# Patient Record
Sex: MALE | Race: WHITE | URBAN - METROPOLITAN AREA
[De-identification: names, ages, dates, MRNs, and addresses within clinical notes are randomized per-mention and may not be internally consistent; named-entity substitution may affect disease eponyms.]

---

## 2025-06-04 ENCOUNTER — OFFICE VISIT (OUTPATIENT)
Age: 75
End: 2025-06-04
Payer: MEDICARE

## 2025-06-04 VITALS — HEIGHT: 71 IN | BODY MASS INDEX: 27.29 KG/M2 | WEIGHT: 194.9 LBS

## 2025-06-04 DIAGNOSIS — M25.562 LEFT KNEE PAIN, UNSPECIFIED CHRONICITY: Primary | ICD-10-CM

## 2025-06-04 DIAGNOSIS — M17.12 ARTHRITIS OF LEFT KNEE: ICD-10-CM

## 2025-06-04 PROCEDURE — 99203 OFFICE O/P NEW LOW 30 MIN: CPT | Performed by: ORTHOPAEDIC SURGERY

## 2025-06-04 PROCEDURE — 1123F ACP DISCUSS/DSCN MKR DOCD: CPT | Performed by: ORTHOPAEDIC SURGERY

## 2025-06-04 PROCEDURE — 1159F MED LIST DOCD IN RCRD: CPT | Performed by: ORTHOPAEDIC SURGERY

## 2025-06-04 RX ORDER — SIMVASTATIN 40 MG
40 TABLET ORAL NIGHTLY
COMMUNITY

## 2025-06-04 RX ORDER — ROSUVASTATIN CALCIUM 10 MG/1
TABLET, COATED ORAL
COMMUNITY

## 2025-06-04 RX ORDER — METHYLPREDNISOLONE 4 MG/1
TABLET ORAL
Qty: 1 KIT | Refills: 0 | Status: SHIPPED | OUTPATIENT
Start: 2025-06-04 | End: 2025-06-10

## 2025-06-04 RX ORDER — ALLOPURINOL 100 MG/1
200 TABLET ORAL DAILY
COMMUNITY

## 2025-06-04 NOTE — PROGRESS NOTES
Patient ID:  Alejandro Weeks  [id number]  75 y.o.  1950    Today: June 4, 2025          Chief Complaint:  Left Knee pain    HPI:       Alejandro Weeks is a 75 y.o. male seen for evaluation and treatment of left knee pain.  He notes global knee pain predominant with the anterior aspect of his knee after standing in an event at the club.  He denies any acute trauma. Generally, symptoms improve with sitting/rest. The pain affects the patient’s activities of daily living and quality of life. . Pain ranges from approximately 4-8 in a cyclical fashion with periods of acute exacerbation.  Denies any acute injury but he has had an intra-articular injection in the past.  He takes anti-inflammatory medicine occasionally with good pain relief.  He is using no assistive ice for ambulation.  He is not having functionally pain      Past Medical History:  No past medical history on file.    Past Surgical History:  No past surgical history on file.     Medications:     Prior to Admission medications    Medication Sig Start Date End Date Taking? Authorizing Provider   Coenzyme Q10 (COQ-10) 200 MG CAPS  6/1/25  Yes Diana Saha MD   Multiple Vitamins-Minerals (CENTRUM SILVER 50+MEN PO)  6/1/25  Yes Diana Saha MD   allopurinol (ZYLOPRIM) 100 MG tablet Take 2 tablets by mouth daily    Diana Saha MD   rosuvastatin (CRESTOR) 10 MG tablet TAKE 1 TABLET BY MOUTH EVERY DAY NIGHTLY    Diana Saha MD   simvastatin (ZOCOR) 40 MG tablet Take 1 tablet by mouth nightly    Diana Saha MD       Family History:   No family history on file.    Social History:      Social History     Tobacco Use    Smoking status: Not on file    Smokeless tobacco: Not on file   Substance Use Topics    Alcohol use: Not on file         Allergies:      Allergies   Allergen Reactions    Atorvastatin Myalgia     Bilateral shoulder pain        Vitals:   Ht 1.803 m (5' 11\")   Wt 88.4 kg (194 lb 14.4 oz)   BMI 27.18